# Patient Record
Sex: MALE | Race: WHITE
[De-identification: names, ages, dates, MRNs, and addresses within clinical notes are randomized per-mention and may not be internally consistent; named-entity substitution may affect disease eponyms.]

---

## 2022-01-31 ENCOUNTER — HOSPITAL ENCOUNTER (EMERGENCY)
Dept: HOSPITAL 49 - FER | Age: 72
Discharge: HOME | End: 2022-01-31
Payer: COMMERCIAL

## 2022-01-31 DIAGNOSIS — U07.1: Primary | ICD-10-CM

## 2022-01-31 DIAGNOSIS — I10: ICD-10-CM

## 2022-01-31 PROCEDURE — U0002 COVID-19 LAB TEST NON-CDC: HCPCS

## 2023-05-23 NOTE — PROGRESS NOTES
"Chief Complaint: Benign Prostatic Hypertrophy    Subjective         History of Present Illness  Kwaku Cloud is a 72 y.o. male presents to [facility] UROLOG[facility] to be seen for BPH.    Patient was seen by his PCP for weak urinary stream she ordered a PSA which was 9.2.  He was already on tamsulosin 0.8 mg daily, he was started on finasteride due to the weak urine stream.  He does report that his symptoms have started to improve already with the finasteride.  He also reports that he did have 2 episodes where he was traveling and had to stop hospital and have a catheter placed.  This was prior to starting on the finasteride.    Frequency- admits    Urgency- admits    Incontinence- admits    Nocturia- up to 10 times per night, but reports this was after drinking a soda with caffeine    Stream-weak    Perineal pain- denies    Dysuria- 2 nights ago, none now    GH- denies     surgeries- denies    Family history of  malignancy- denies    Hx of colon cancer- 2004    Cardiopulmonary- denies    Anticoagulants- denies    Smoker- denies    PSA  4/27/2023 9.20    Objective     Past Medical History:   Diagnosis Date   • Hypertension        History reviewed. No pertinent surgical history.      Current Outpatient Medications:   •  finasteride (PROSCAR) 5 MG tablet, Take 1 tablet by mouth Every Morning., Disp: , Rfl:   •  lisinopril (PRINIVIL,ZESTRIL) 20 MG tablet, Take 1 tablet by mouth Daily., Disp: , Rfl:   •  tamsulosin (FLOMAX) 0.4 MG capsule 24 hr capsule, TAKE TWO CAPSULES BY MOUTH AT BEDTIME FOR PROSTATE, Disp: , Rfl:     No Known Allergies     History reviewed. No pertinent family history.    Social History     Socioeconomic History   • Marital status:    Tobacco Use   • Smoking status: Former     Types: Cigarettes     Passive exposure: Past   • Smokeless tobacco: Never       Vital Signs:   Resp 16   Ht 182.9 cm (72\")   Wt 95.8 kg (211 lb 3.2 oz)   BMI 28.64 kg/m²      Physical Exam  Vitals " reviewed.   Constitutional:       Appearance: Normal appearance.   Neurological:      General: No focal deficit present.      Mental Status: He is alert and oriented to person, place, and time.   Psychiatric:         Mood and Affect: Mood normal.         Behavior: Behavior normal.          Result Review :   The following data was reviewed by: HOMERO Nichols on 05/30/2023:  Results for orders placed or performed in visit on 05/30/23   Bladder Scan   Result Value Ref Range    Urine Volume 22    POC Urinalysis Dipstick, Automated    Specimen: Urine   Result Value Ref Range    Color Yellow Yellow, Straw, Dark Yellow, Evon    Clarity, UA Clear Clear    Specific Gravity  1.030 1.005 - 1.030    pH, Urine 5.5 5.0 - 8.0    Leukocytes Trace (A) Negative    Nitrite, UA Negative Negative    Protein, POC Trace (A) Negative mg/dL    Glucose, UA Negative Negative mg/dL    Ketones, UA Negative Negative    Urobilinogen, UA 0.2 E.U./dL Normal, 0.2 E.U./dL    Bilirubin Negative Negative    Blood, UA Negative Negative    Lot Number [id number]     Expiration Date 7/2,024           Bladder Scan interpretation 05/30/2023    Estimation of residual urine via [email or web address] 3000 Verathon Bladder Scan  MA/nurse performing: Belia COLBERT MA  Residual Urine: 22 ml  Indication: Benign prostatic hyperplasia with lower urinary tract symptoms, symptom details unspecified    Elevated prostate specific antigen (PSA)   Position: Supine  Examination: Incremental scanning of the suprapubic area using 2.0 MHz transducer using copious amounts of acoustic gel.   Findings: An anechoic area was demonstrated which represented the bladder, with measurement of residual urine as noted. I inspected this myself. In that the residual urine was  insignificant, refer to plan for treatment and plan necessary at this time.         Procedures        Assessment and Plan    Diagnoses and all orders for this visit:    1. Benign prostatic hyperplasia with lower urinary tract  symptoms, symptom details unspecified (Primary)  -     Bladder Scan  -     POC Urinalysis Dipstick, Automated    2. Elevated prostate specific antigen (PSA)  -     MRI Prostate With & Without Contrast; Future    Given his elevated PSA, we will go ahead and schedule him for an MRI of the prostate.  If his MRI is negative, we will discuss options for procedures for opening of his prostate.  If his MRI is positive we will schedule him for biopsy.    He will follow-up with me 1 week after MRI to go over results and come up with a plan.  He cannot do the MRI until at least July due to travel during June.  This will also give more time for the finasteride to work to see if he is at the point where he wants to consider a procedure.        Follow Up   Return for 1 week after MRI.  Patient was given instructions and counseling regarding his condition or for health maintenance advice. Please see specific information pulled into the AVS if appropriate.         This document has been electronically signed by HOMERO Nichols  May 30, 2023 10:52 EDT

## 2023-05-30 ENCOUNTER — OFFICE VISIT (OUTPATIENT)
Dept: UROLOGY | Facility: CLINIC | Age: 73
End: 2023-05-30

## 2023-05-30 VITALS — WEIGHT: 211.2 LBS | HEIGHT: 72 IN | BODY MASS INDEX: 28.61 KG/M2 | RESPIRATION RATE: 16 BRPM

## 2023-05-30 DIAGNOSIS — N40.1 BENIGN PROSTATIC HYPERPLASIA WITH LOWER URINARY TRACT SYMPTOMS, SYMPTOM DETAILS UNSPECIFIED: Primary | ICD-10-CM

## 2023-05-30 DIAGNOSIS — R97.20 ELEVATED PROSTATE SPECIFIC ANTIGEN (PSA): ICD-10-CM

## 2023-05-30 LAB
BILIRUB BLD-MCNC: NEGATIVE MG/DL
CLARITY, POC: CLEAR
COLOR UR: YELLOW
EXPIRATION DATE: ABNORMAL
GLUCOSE UR STRIP-MCNC: NEGATIVE MG/DL
KETONES UR QL: NEGATIVE
LEUKOCYTE EST, POC: ABNORMAL
Lab: ABNORMAL
NITRITE UR-MCNC: NEGATIVE MG/ML
PH UR: 5.5 [PH] (ref 5–8)
PROT UR STRIP-MCNC: ABNORMAL MG/DL
RBC # UR STRIP: NEGATIVE /UL
SP GR UR: 1.03 (ref 1–1.03)
URINE VOLUME: 22
UROBILINOGEN UR QL: ABNORMAL

## 2023-05-30 RX ORDER — FINASTERIDE 5 MG/1
5 TABLET, FILM COATED ORAL EVERY MORNING
COMMUNITY
Start: 2023-04-28

## 2023-05-30 RX ORDER — LISINOPRIL 20 MG/1
20 TABLET ORAL DAILY
COMMUNITY
Start: 2023-05-08

## 2023-05-30 RX ORDER — TAMSULOSIN HYDROCHLORIDE 0.4 MG/1
CAPSULE ORAL
COMMUNITY
Start: 2023-03-21

## 2023-06-02 ENCOUNTER — PATIENT ROUNDING (BHMG ONLY) (OUTPATIENT)
Dept: SURGERY | Facility: CLINIC | Age: 73
End: 2023-06-02

## 2023-06-02 NOTE — PROGRESS NOTES
June 2, 2023    Hello, may I speak with Kwaku Cloud?    My name is CHAPARRITA CHUA      I am  with [facility]  [facility]  [address]  [place] [address] [phone].    Before we get started may I verify your date of birth? 1950    I am calling to officially welcome you to our practice and ask about your recent visit. Is this a good time to talk? yes    Tell me about your visit with us. What things went well?  PATIENT STATED EVERYONE WAS GREAT AT THE APPOINTMENT. HE REITERATED THAT HE APPRECIATED THE WAY OUR OFFICE AND STAFF WERE FRIENDLY AND KNOWLEDGEABLE        We're always looking for ways to make our patients' experiences even better. Do you have recommendations on ways we may improve?  no    Overall were you satisfied with your first visit to our practice? yes       I appreciate you taking the time to speak with me today. Is there anything else I can do for you? no      Thank you, and have a great day.

## 2023-07-25 ENCOUNTER — HOSPITAL ENCOUNTER (OUTPATIENT)
Dept: CT IMAGING | Facility: HOSPITAL | Age: 73
Discharge: HOME | End: 2023-07-25
Admitting: NURSE PRACTITIONER
Payer: COMMERCIAL

## 2023-07-25 DIAGNOSIS — N21.0 BLADDER CALCULI: ICD-10-CM

## 2023-07-25 PROCEDURE — 74176 CT ABD & PELVIS W/O CONTRAST: CPT

## 2023-07-28 PROBLEM — N21.0 BLADDER STONE: Status: ACTIVE | Noted: 2023-07-28

## 2023-07-28 PROBLEM — N40.1 BENIGN PROSTATIC HYPERPLASIA WITH LOWER URINARY TRACT SYMPTOMS: Status: ACTIVE | Noted: 2023-07-28

## 2023-07-28 PROBLEM — R97.20 ELEVATED PSA: Status: ACTIVE | Noted: 2023-07-28

## 2023-08-01 ENCOUNTER — OFFICE VISIT (OUTPATIENT)
Dept: UROLOGY | Facility: CLINIC | Age: 73
End: 2023-08-01
Payer: COMMERCIAL

## 2023-08-01 VITALS — RESPIRATION RATE: 16 BRPM | WEIGHT: 212.8 LBS | HEIGHT: 72 IN | BODY MASS INDEX: 28.82 KG/M2

## 2023-08-01 DIAGNOSIS — N40.1 BENIGN PROSTATIC HYPERPLASIA WITH LOWER URINARY TRACT SYMPTOMS, SYMPTOM DETAILS UNSPECIFIED: Primary | ICD-10-CM

## 2023-08-01 DIAGNOSIS — R97.20 ELEVATED PSA: ICD-10-CM

## 2023-08-01 DIAGNOSIS — N21.0 BLADDER STONE: ICD-10-CM

## 2023-08-01 DIAGNOSIS — R06.02 SHORTNESS OF BREATH: ICD-10-CM

## 2023-08-01 RX ORDER — OMEPRAZOLE 20 MG/1
1 CAPSULE, DELAYED RELEASE ORAL DAILY
COMMUNITY
Start: 2023-06-01

## 2023-08-15 ENCOUNTER — TELEPHONE (OUTPATIENT)
Dept: UROLOGY | Facility: CLINIC | Age: 73
End: 2023-08-15
Payer: COMMERCIAL

## 2023-08-15 NOTE — TELEPHONE ENCOUNTER
PT CALLED TO R/S HIS REE W/ SANDEEP     STATES DO NOT DOUBLE BOOK, PLEASE ADVISE ON SCHEDULING...    ALSO, PATIENT WOULD LIKE A CALL FROM MEDICAL STAFF EXPLAINING PROCEDURE.

## 2023-08-16 NOTE — TELEPHONE ENCOUNTER
Called and spoke with patient, he asked me to go over the cystoscopy procedure with him. I made patient aware  will use lidocaine jelly to numb the urethra, insert the scope with flowing water, look in the bladder for any abnormalities, tumors, stones, etc. Patient asked if it was painful, I explained the whole procedure might last about 3 minutes, most of our patients tolerate procedure very well and we do several of these in office daily. Patient agreed and would like to reschedule appt.

## 2023-08-16 NOTE — TELEPHONE ENCOUNTER
CALLED PT BACK AND R/S APPT    Future Appointments         Provider Department Center    8/29/2023 3:30 PM Jaylen Hines MD; PROC RM 1 UROLOGY [place] [facility] UROLOG[facility]    9/28/2023 3:00 PM Yosvany Greene MD [facility] PULMONARY & CRITICAL CARE MEDICINE [facility]

## 2023-08-28 ENCOUNTER — TELEPHONE (OUTPATIENT)
Dept: UROLOGY | Facility: CLINIC | Age: 73
End: 2023-08-28

## 2023-08-28 NOTE — TELEPHONE ENCOUNTER
Pt is scheduled for a cysto in the office tomorrow. He does not think he can tolerate it. He had one before at another office and states that he could not do it again. Can you please call and discuss?

## 2023-08-29 ENCOUNTER — PROCEDURE VISIT (OUTPATIENT)
Dept: UROLOGY | Facility: CLINIC | Age: 73
End: 2023-08-29
Payer: COMMERCIAL

## 2023-08-29 DIAGNOSIS — N40.1 BENIGN PROSTATIC HYPERPLASIA WITH LOWER URINARY TRACT SYMPTOMS, SYMPTOM DETAILS UNSPECIFIED: Primary | ICD-10-CM

## 2023-08-29 NOTE — TELEPHONE ENCOUNTER
Called and spoke with patient. Made him aware I received results of the cystoscopy that was done in [place]. He will still come in today for TRUS and discussion of aquablation.

## 2023-08-31 ENCOUNTER — TELEPHONE (OUTPATIENT)
Dept: UROLOGY | Facility: CLINIC | Age: 73
End: 2023-08-31
Payer: COMMERCIAL

## 2023-10-30 ENCOUNTER — TELEPHONE (OUTPATIENT)
Dept: UROLOGY | Facility: CLINIC | Age: 73
End: 2023-10-30
Payer: COMMERCIAL

## 2023-10-30 DIAGNOSIS — N40.1 BENIGN PROSTATIC HYPERPLASIA WITH LOWER URINARY TRACT SYMPTOMS, SYMPTOM DETAILS UNSPECIFIED: Primary | ICD-10-CM

## 2023-10-30 RX ORDER — TAMSULOSIN HYDROCHLORIDE 0.4 MG/1
1 CAPSULE ORAL 2 TIMES DAILY
Qty: 180 CAPSULE | Refills: 4 | Status: SHIPPED | OUTPATIENT
Start: 2023-10-30

## 2023-10-30 NOTE — TELEPHONE ENCOUNTER
PATIENT CALLED AND ASKED FOR A PRESCRIPTION FOR FLOMAX 0.4MG TAKE 2 BEFORE BEDTIME TO GO TO [facility] IN [place].    HE SAID HIS PREVIOUS UROLOGIST PRESCRIBED THIS FOR HIM BEFORE HE CAME HERE, SO NO ONE HERE HAS WRITTEN THE PRESCRIPTION FOR HIM YET.

## 2023-10-30 NOTE — TELEPHONE ENCOUNTER
Notified pt that per Shannan NP, he can take 2 Flomax at bedtime. He verified pharmacy and verbalized understanding

## 2023-12-12 NOTE — PROGRESS NOTES
Chief Complaint: Urologic complaint    Subjective         History of Present Illness  Kwaku Cloud is a 72 y.o. male         BPH with bladder stones  Elevated PSA      Patient currently on finasteride for the last 6 months.  Also on Flomax 0.8 mg daily    Not bothered voiding okay currently      No CAD, no anticoagulation, non-smoker      ED -still worried about this.  Patient is having trouble with erections for greater than 1 year cannot have intercourse currently.            Previous      6/22 cystoscopy-Dr. Jamison - prostate lateral lobes with moderate obstruction median lobe with severe obstruction.  Small bladder stone.  Bladder mucosa moderately trabeculated.    6/22  Urologist records reviewed - started finasteride, discussed TURP      7/25/2023 CT abdomen/pelvis without - noncalcified lung nodules.  Consider low-dose CT chest.  Stones in the urinary bladder, 10 stones largest being 1 cm.  99g prostate .  Images reviewed      Has had retention x2 with catheter placement prior to starting finasteride    Frequency, urgency, incontinence nocturia x10 if he drinks caffeine in the evening, weak stream    No  surgeries, no  family history    Colon cancer 2004    He does have SOA at times      PVR    12/23     014    Bladder Scan interpretation 12/15/2023    Estimation of residual urine via BVI 3000 Verathon Bladder Scan  MA/nurse performing: sl  Residual Urine: 14 ml  Indication: Benign prostatic hyperplasia with lower urinary tract symptoms, symptom details unspecified   Position: Supine  Examination: Incremental scanning of the suprapubic area using 2.0 MHz transducer using copious amounts of acoustic gel.   Findings: An anechoic area was demonstrated which represented the bladder, with measurement of residual urine as noted. I inspected this myself. In that the residual urine was stable or insignificant, refer to plan for treatment and plan necessary at this time.        6/23   022, UA-negative    4/23    1.1, GFR  > 60      PSA    7/23   MRI prostate - 106 g , thickening of the bladder wall, no signs of localizing lesion.  Bladder stone  PSAd  0.08  4/23    9.2      IPSS - 28  Quality of Life - 4      Objective     Past Medical History:   Diagnosis Date    Hypertension        No past surgical history on file.      Current Outpatient Medications:     finasteride (PROSCAR) 5 MG tablet, Take 1 tablet by mouth Every Morning., Disp: , Rfl:     lisinopril (PRINIVIL,ZESTRIL) 20 MG tablet, Take 1 tablet by mouth Daily., Disp: , Rfl:     omeprazole (priLOSEC) 20 MG capsule, Take 1 capsule by mouth Daily., Disp: , Rfl:     tamsulosin (FLOMAX) 0.4 MG capsule 24 hr capsule, Take 1 capsule by mouth 2 (Two) Times a Day., Disp: 180 capsule, Rfl: 4    No Known Allergies     No family history on file.    Social History     Socioeconomic History    Marital status:    Tobacco Use    Smoking status: Former     Types: Cigarettes     Passive exposure: Past    Smokeless tobacco: Never   Vaping Use    Vaping Use: Never used       Vital Signs:   There were no vitals taken for this visit.                 Assessment and Plan    Diagnoses and all orders for this visit:    1. Benign prostatic hyperplasia with lower urinary tract symptoms, symptom details unspecified (Primary)        Bladder stones/BPH history of retention      Patient's been in retention a few times and is forming bladder stones which we discussed is both indications for prostate surgery.  I did recommend cystolitholapaxy with aquablation.  I did go over them with him again today.    Risks and benefits were discussed including bleeding, infection and damage to the urinary system.  We also discussed the risk of anesthesia up to and including death.  Patient voiced understanding     Patient has had a hard time with office cystoscopy in the past, is not want to do this anymore.  Because of his bladder stones and has been several months ago and have a follow-up in 6 weeks  with CT abdomen/pelvis without to see what his stones look like.  At that time we can make a decision on whether or not we can do cystoscopy with active Paxene Aquablation at the same time.  Patient voiced understanding    Will try to get him scheduled at his next visit      PVR at f/u         ED    Patient is wanting treatment.   After discussion we will start him on tadalafil 20 mg tablets as needed sexual intercourse.  Risk and benefits discussed.  Patient understands he has erection for greater than 4 hours go to emergency room or risk no erections in the future.

## 2023-12-15 ENCOUNTER — OFFICE VISIT (OUTPATIENT)
Dept: UROLOGY | Facility: CLINIC | Age: 73
End: 2023-12-15
Payer: COMMERCIAL

## 2023-12-15 VITALS — HEIGHT: 72 IN | BODY MASS INDEX: 29.66 KG/M2 | RESPIRATION RATE: 16 BRPM | WEIGHT: 219 LBS

## 2023-12-15 DIAGNOSIS — N40.1 BENIGN PROSTATIC HYPERPLASIA WITH LOWER URINARY TRACT SYMPTOMS, SYMPTOM DETAILS UNSPECIFIED: Primary | ICD-10-CM

## 2023-12-15 DIAGNOSIS — N52.01 ERECTILE DYSFUNCTION DUE TO ARTERIAL INSUFFICIENCY: ICD-10-CM

## 2023-12-15 LAB — SPECIMEN VOL 24H UR: 14 L

## 2023-12-15 RX ORDER — TADALAFIL 20 MG/1
20 TABLET ORAL DAILY PRN
Qty: 5 TABLET | Refills: 5 | Status: SHIPPED | OUTPATIENT
Start: 2023-12-15

## 2024-01-02 ENCOUNTER — HOSPITAL ENCOUNTER (OUTPATIENT)
Dept: CT IMAGING | Facility: HOSPITAL | Age: 74
Discharge: HOME | End: 2024-01-02
Admitting: UROLOGY
Payer: COMMERCIAL

## 2024-01-02 DIAGNOSIS — N40.1 BENIGN PROSTATIC HYPERPLASIA WITH LOWER URINARY TRACT SYMPTOMS, SYMPTOM DETAILS UNSPECIFIED: ICD-10-CM

## 2024-01-02 PROCEDURE — 74176 CT ABD & PELVIS W/O CONTRAST: CPT

## 2024-01-23 NOTE — PROGRESS NOTES
Chief Complaint: Urologic complaint    Subjective         History of Present Illness  Kwaku Cloud is a 73 y.o. male         BPH with bladder stones  Elevated PSA        Cannot do office cystoscopy        1/2/2024 CT abdomen/pelvis without - fatty liver, 2 cm cluster of stones posterior urinary bladder on the left.  Enlarged prostate gland.      on finasteride for the last 6 months.   on Flomax 0.8 mg daily.  No side effects.    voiding okay currently      No CAD, no anticoagulation, non-smoker      ED -still worried about this.  Patient is having trouble with erections for greater than 1 year cannot have intercourse currently.      tadalafil 20 mg -did not help    Not interested in Trimix.      Previous      6/22 cystoscopy-Dr. Jamison - prostate lateral lobes with moderate obstruction median lobe with severe obstruction.  Small bladder stone.  Bladder mucosa moderately trabeculated.    6/22  Urologist records reviewed - started finasteride, discussed TURP      7/25/2023 CT abdomen/pelvis without - noncalcified lung nodules.  Consider low-dose CT chest.  Stones in the urinary bladder, 10 stones largest being 1 cm.  99g prostate .  Images reviewed      Has had retention x2 with catheter placement prior to starting finasteride    Frequency, urgency, incontinence nocturia x10 if he drinks caffeine in the evening, weak stream    No  surgeries, no  family history    Colon cancer 2004    He does have SOA at times      PVR    12/23     014         6/23   022, UA-negative    4/23   1.1, GFR  > 60      PSA    7/23   MRI prostate - 106 g , thickening of the bladder wall, no signs of localizing lesion.  Bladder stone  PSAd  0.08  4/23    9.2      IPSS - 28  Quality of Life - 4      Bladder Scan interpretation 01/26/2024    Estimation of residual urine via BVI 3000 Verathon Bladder Scan  MA/nurse performing: SHIELA Bilyl  Residual Urine: 18 ml  Indication: Benign prostatic hyperplasia with lower urinary tract symptoms,  symptom details unspecified    Erectile dysfunction due to arterial insufficiency   Position: Supine  Examination: Incremental scanning of the suprapubic area using 2.0 MHz transducer using copious amounts of acoustic gel.   Findings: An anechoic area was demonstrated which represented the bladder, with measurement of residual urine as noted. I inspected this myself. In that the residual urine was stable or insignificant, refer to plan for treatment and plan necessary at this time.          Objective     Past Medical History:   Diagnosis Date    Hypertension        No past surgical history on file.      Current Outpatient Medications:     finasteride (PROSCAR) 5 MG tablet, Take 1 tablet by mouth Every Morning., Disp: , Rfl:     lisinopril (PRINIVIL,ZESTRIL) 20 MG tablet, Take 1 tablet by mouth Daily., Disp: , Rfl:     omeprazole (priLOSEC) 20 MG capsule, Take 1 capsule by mouth Daily., Disp: , Rfl:     tadalafil (CIALIS) 20 MG tablet, Take 1 tablet by mouth Daily As Needed for Erectile Dysfunction., Disp: 5 tablet, Rfl: 5    tamsulosin (FLOMAX) 0.4 MG capsule 24 hr capsule, Take 1 capsule by mouth 2 (Two) Times a Day., Disp: 180 capsule, Rfl: 4    No Known Allergies     No family history on file.    Social History     Socioeconomic History    Marital status:    Tobacco Use    Smoking status: Former     Types: Cigarettes     Passive exposure: Past    Smokeless tobacco: Never   Vaping Use    Vaping Use: Never used       Vital Signs:   There were no vitals taken for this visit.                 Assessment and Plan    Diagnoses and all orders for this visit:    1. Benign prostatic hyperplasia with lower urinary tract symptoms, symptom details unspecified (Primary)    2. Erectile dysfunction due to arterial insufficiency        Bladder stones/BPH history of retention     I did recommend cystolitholapaxy with aquablation.   Risks and benefits were discussed including bleeding, infection and damage to the urinary  system.  We also discussed the risk of anesthesia up to and including death.  Patient voiced understanding     Patient has had a hard time with office cystoscopy in the past, is not want to do this anymore.  Because of his bladder stones and has been several months ago and have a follow-up in 6 weeks with CT abdomen/pelvis without to see what his stones look like.  At that time we can make a decision on whether or not we can do cystoscopy with active Paxene Aquablation at the same time.  Patient voiced understanding      Urine culture 2 weeks before       ED      Oral medications not working, not currently interested in Trimix

## 2024-01-26 ENCOUNTER — PREP FOR SURGERY (OUTPATIENT)
Dept: OTHER | Facility: HOSPITAL | Age: 74
End: 2024-01-26
Payer: COMMERCIAL

## 2024-01-26 ENCOUNTER — OFFICE VISIT (OUTPATIENT)
Dept: UROLOGY | Facility: CLINIC | Age: 74
End: 2024-01-26
Payer: COMMERCIAL

## 2024-01-26 VITALS — RESPIRATION RATE: 16 BRPM | HEIGHT: 72 IN | BODY MASS INDEX: 29.8 KG/M2 | WEIGHT: 220 LBS

## 2024-01-26 DIAGNOSIS — N40.1 BENIGN PROSTATIC HYPERPLASIA WITH LOWER URINARY TRACT SYMPTOMS, SYMPTOM DETAILS UNSPECIFIED: Primary | ICD-10-CM

## 2024-01-26 DIAGNOSIS — N40.0 BENIGN PROSTATIC HYPERPLASIA, UNSPECIFIED WHETHER LOWER URINARY TRACT SYMPTOMS PRESENT: Primary | ICD-10-CM

## 2024-01-26 DIAGNOSIS — N52.01 ERECTILE DYSFUNCTION DUE TO ARTERIAL INSUFFICIENCY: ICD-10-CM

## 2024-01-26 PROBLEM — R39.15 BENIGN PROSTATIC HYPERPLASIA (BPH) WITH URINARY URGENCY: Status: ACTIVE | Noted: 2024-01-26

## 2024-01-26 LAB — SPECIMEN VOL 24H UR: 0 L

## 2024-01-26 RX ORDER — SILDENAFIL CITRATE 20 MG/1
TABLET ORAL
COMMUNITY
Start: 2023-12-21

## 2024-01-26 RX ORDER — SODIUM CHLORIDE 9 MG/ML
40 INJECTION, SOLUTION INTRAVENOUS AS NEEDED
OUTPATIENT
Start: 2024-01-26

## 2024-01-26 RX ORDER — SODIUM CHLORIDE 9 MG/ML
100 INJECTION, SOLUTION INTRAVENOUS CONTINUOUS
OUTPATIENT
Start: 2024-01-26

## 2024-01-26 RX ORDER — SODIUM CHLORIDE 0.9 % (FLUSH) 0.9 %
10 SYRINGE (ML) INJECTION AS NEEDED
OUTPATIENT
Start: 2024-01-26

## 2024-01-26 RX ORDER — LEVOFLOXACIN 5 MG/ML
500 INJECTION, SOLUTION INTRAVENOUS ONCE
OUTPATIENT
Start: 2024-01-26 | End: 2024-01-26

## 2024-01-26 RX ORDER — SODIUM CHLORIDE 0.9 % (FLUSH) 0.9 %
3 SYRINGE (ML) INJECTION EVERY 12 HOURS SCHEDULED
OUTPATIENT
Start: 2024-01-26

## 2024-03-28 ENCOUNTER — TELEPHONE (OUTPATIENT)
Dept: UROLOGY | Facility: CLINIC | Age: 74
End: 2024-03-28
Payer: COMMERCIAL

## 2024-03-28 NOTE — TELEPHONE ENCOUNTER
SEBASTIÁN CALLED FROM  SURGERY REVIEW.  SHE SAID THE PATIENT IS SCHEDULED FOR THE AQUABLATION ON 04/11/24.      SHE WANTS TO KNOW IF HE IS HAVING OR HAS HAD A UROFLOW, AND IF HE IS USING A CATHETER.    SHE SAID SHE NEEDS TO KNOW FOR COMPLIANCE REVIEW.

## 2024-03-29 NOTE — TELEPHONE ENCOUNTER
LVM letting Lian know that we are getting pt set up for a uroflow appt next week and that he does not use catheters.

## 2024-03-29 NOTE — TELEPHONE ENCOUNTER
CALLED  OFFICE AND SPOKE W/SINDY AND HE SCHEDULED UROFLOW TEST ON 4/3/24/CALLED PT AND INFORMED PT OF APPT/PT SAID HE IS GOING TO CALL  OFFICE AND RS/UROFLOW

## 2024-04-01 ENCOUNTER — TELEPHONE (OUTPATIENT)
Dept: UROLOGY | Facility: CLINIC | Age: 74
End: 2024-04-01
Payer: COMMERCIAL

## 2024-04-02 ENCOUNTER — PROCEDURE VISIT (OUTPATIENT)
Dept: UROLOGY | Facility: CLINIC | Age: 74
End: 2024-04-02
Payer: COMMERCIAL

## 2024-04-02 DIAGNOSIS — N40.1 BENIGN PROSTATIC HYPERPLASIA WITH LOWER URINARY TRACT SYMPTOMS, SYMPTOM DETAILS UNSPECIFIED: Primary | ICD-10-CM

## 2024-04-02 NOTE — PROGRESS NOTES
Procedure   UROFLOWMETRY    Dx for indication BPH        Voiding time :                 23.1  S    Timed peak flow:             6.7  S    Q-[place]                          11.0 mL/S    Average flow rate            5.5 mL/S    Interval     2    Voided volume               123 mL    Post void bladder scan    34 cc.      Low urine volume may contribute to lower average flow rate.  Patient reports voids approximately 3-4 times per day and estimates amount per void about the same.  Also small amount of fluid intake on average a couple of soft drinks and a glass of water on average.  Reports his primary symptom is feeling not able to urinate unless he takes Flomax.     Patient to follow up with ordering provider Dr. Jaylen Hines for further treatment and evaluation as planned.         printout image to be scanned into media

## 2024-04-08 DIAGNOSIS — N52.01 ERECTILE DYSFUNCTION DUE TO ARTERIAL INSUFFICIENCY: ICD-10-CM

## 2024-04-08 DIAGNOSIS — N40.1 BENIGN PROSTATIC HYPERPLASIA WITH LOWER URINARY TRACT SYMPTOMS, SYMPTOM DETAILS UNSPECIFIED: ICD-10-CM

## 2024-04-08 NOTE — TELEPHONE ENCOUNTER
Spoke to pt who said he did do his urine culture at a [facility]. I did speak to the clinic who did say he did a urine culture. It was positive for ecoli. They are faxing the report to me.

## 2024-04-08 NOTE — TELEPHONE ENCOUNTER
Notified pt that I did speak to the [facility] and Dr Hines regarding a supposed positive ucx, growing out Ecoli. I did advise pt that Dr Hines will not be able to do surgery this week if we get the report and is in in fact, positive. Pt understands and states he is having some burning. He has not been on any abx. I advised him I will be in touch once we hear something.

## 2024-05-28 ENCOUNTER — TELEPHONE (OUTPATIENT)
Dept: UROLOGY | Facility: CLINIC | Age: 74
End: 2024-05-28
Payer: COMMERCIAL

## 2024-05-28 DIAGNOSIS — N30.00 ACUTE CYSTITIS WITHOUT HEMATURIA: Primary | ICD-10-CM

## 2024-05-28 NOTE — TELEPHONE ENCOUNTER
Called pt. He would like to reschedule his surgery to 11/07/24. He is aware that he will receive a call on the day prior to the procedure with arrival time and a separate call from PAT to go over all preop instructions. He will do a ua/ucx 2 weeks prior to procedure as well. Pt verbalized understanding of all instruction via teach back

## 2024-05-28 NOTE — TELEPHONE ENCOUNTER
Patient called and he states he will need to reschedule states his son is at [facility] with congestive heart failure and needs to reschedule his surgery until November. Patient would like a call back.

## 2024-10-23 ENCOUNTER — LAB (OUTPATIENT)
Dept: LAB | Facility: HOSPITAL | Age: 74
End: 2024-10-23
Payer: COMMERCIAL

## 2024-10-23 DIAGNOSIS — N30.00 ACUTE CYSTITIS WITHOUT HEMATURIA: ICD-10-CM

## 2024-10-23 LAB
BACTERIA UR QL AUTO: ABNORMAL /HPF
BILIRUB UR QL STRIP: NEGATIVE
CLARITY UR: ABNORMAL
COD CRY URNS QL: PRESENT /HPF
COLOR UR: ABNORMAL
GLUCOSE UR STRIP-MCNC: ABNORMAL MG/DL
HGB UR QL STRIP.AUTO: NEGATIVE
HYALINE CASTS UR QL AUTO: ABNORMAL /LPF
KETONES UR QL STRIP: ABNORMAL
LEUKOCYTE ESTERASE UR QL STRIP.AUTO: ABNORMAL
NITRITE UR QL STRIP: NEGATIVE
PH UR STRIP.AUTO: 5.5 [PH] (ref 5–8)
PROT UR QL STRIP: ABNORMAL
RBC # UR STRIP: ABNORMAL /HPF
REF LAB TEST METHOD: ABNORMAL
SP GR UR STRIP: 1.02 (ref 1–1.03)
SQUAMOUS #/AREA URNS HPF: ABNORMAL /HPF
UROBILINOGEN UR QL STRIP: ABNORMAL
WBC # UR STRIP: ABNORMAL /HPF

## 2024-10-23 PROCEDURE — 81001 URINALYSIS AUTO W/SCOPE: CPT

## 2024-10-23 PROCEDURE — 87086 URINE CULTURE/COLONY COUNT: CPT

## 2024-10-24 ENCOUNTER — TELEPHONE (OUTPATIENT)
Dept: UROLOGY | Age: 74
End: 2024-10-24

## 2024-10-24 NOTE — TELEPHONE ENCOUNTER
Caller: Kwaku Cloud     Relationship: SELF    Best call back number:  [phone]    What is the best time to reach you: ANYTIME    Who are you requesting to speak with (clinical staff, provider,  specific staff member): CLINICAL    Do you know the name of the person who called: INCOMING CALL    What was the call regarding: PT HAD URINE LABS DONE AND IS WANTING TO MAKE SURE THAT EVERYTHING IS GOOD TO GO FOR HIS PROCEDURE.     PLEASE REVIEW AND GIVE PT A CALL BACK.    Is it okay if the provider responds through [employer]: NO, PHONE CALL PREFERRED

## 2024-10-25 ENCOUNTER — TELEPHONE (OUTPATIENT)
Dept: UROLOGY | Facility: CLINIC | Age: 74
End: 2024-10-25
Payer: COMMERCIAL

## 2024-10-25 LAB — BACTERIA SPEC AEROBE CULT: NO GROWTH

## 2024-10-25 NOTE — TELEPHONE ENCOUNTER
Spoke with niece.  Need patient to call back.  We need to cancel his surgery for Nov 7 due to fluid shortage.  We will call back when we will be able to get him back on the schedule.  OK for HUB to relay message.

## 2024-10-25 NOTE — TELEPHONE ENCOUNTER
PATIENT RETURNED CALL.  I READ THE MESSAGE TO HIM, PER ARIEL:    Spoke with niece.  Need patient to call back.  We need to cancel his surgery for Nov 7 due to fluid shortage.  We will call back when we will be able to get him back on the schedule.       PATIENT VOICED UNDERSTANDING.

## 2024-10-29 NOTE — TELEPHONE ENCOUNTER
PT CALLED TO CHECK ON STATUS OF OF TEST RESULTS-URINE CULTURE FROM 10/23/24 - PLEASE CALL TO DISCUSS RESULTS.    PER PREVIOUS MESSAGE WILL ALSO NEED TO RESCHEDULE PROCEDURE THAT IS SCHEDULED ON 11/7/24

## 2025-01-08 ENCOUNTER — TELEPHONE (OUTPATIENT)
Dept: UROLOGY | Age: 75
End: 2025-01-08
Payer: COMMERCIAL

## 2025-01-08 NOTE — TELEPHONE ENCOUNTER
BRENDAN CALLED FROM [facility].  SHE ASKED FOR REFILLS ON FLOMAX.  SHE SAID THE PATIENT WAS THERE, AND ASKED IF THEY WOULD GET AN ANSWER TODAY.  I TOLD HER DR. HOPKINS IS NOT HERE.   I WOULD SEND A MESSAGE TO THE NURSE.  HE MAY HAVE TO HAVE AN APPOINTMENT.    CALL AND ASK FOR BRENDAN OR SERGEI.    #[phone]

## 2025-01-09 DIAGNOSIS — N40.1 BENIGN PROSTATIC HYPERPLASIA WITH LOWER URINARY TRACT SYMPTOMS, SYMPTOM DETAILS UNSPECIFIED: ICD-10-CM

## 2025-01-09 RX ORDER — TAMSULOSIN HYDROCHLORIDE 0.4 MG/1
1 CAPSULE ORAL 2 TIMES DAILY
Qty: 180 CAPSULE | Refills: 4 | Status: SHIPPED | OUTPATIENT
Start: 2025-01-09

## 2025-01-15 ENCOUNTER — TELEPHONE (OUTPATIENT)
Dept: UROLOGY | Age: 75
End: 2025-01-15
Payer: COMMERCIAL

## 2025-01-15 NOTE — TELEPHONE ENCOUNTER
Spoke to patient. Patient is still interested in aquablation. I informed patient that we need a repeat uroflow, as well as a volume study and we will get him in for an appointment and get him scheduled. He verbalized understanding of information.

## 2025-02-09 NOTE — PROGRESS NOTES
Chief Complaint: Urologic complaint    Subjective         History of Present Illness  Kwaku Cloud is a 74 y.o. male             BPH with bladder stones  Elevated PSA        2/25 uroflow  -16.4,VV 68      currently on finasteride for the last 6 months.   on Flomax 0.8 mg daily.  Dependent on Flomax.    Tadalafil 20 - did not help.        No CAD, no anticoagulation, non-smoker        1/24 CT abdomen/pelvis without - 2 cm cluster of stones posterior urinary bladder on the left.    Previous      10/23/2024 UA-3-5 RBCs, no bacteria, nitrite negative, urine culture negative        4/1/24 urine culture - >  100,000 E. Coli -   canceled surgery, placed on Levaquin 500 mg x 10 days and Florajen.  Repeat culture, preferably one of our labs.  2 weeks before repeat surgery, place surgery on aThursday 4/24 Q-[place]leveland of 11, voided volume 123, PVR 34    1/24 CT abdomen/pelvis without - 2 cm cluster of stones posterior urinary bladder on the left.      6/22 cystoscopy-Dr. Jamison - prostate lateral lobes with moderate obstruction median lobe with severe obstruction.  Small bladder stone.  Bladder mucosa moderately trabeculated.    6/22  Urologist records reviewed - started finasteride, discussed TURP      7/25/2023 CT abdomen/pelvis without - noncalcified lung nodules.  Consider low-dose CT chest.  Stones in the urinary bladder, 10 stones largest being 1 cm.  99g prostate .  Images reviewed      Has had retention x2 with catheter placement prior to starting finasteride    Frequency, urgency, incontinence nocturia x10 if he drinks caffeine in the evening, weak stream    No  surgeries, no  family history    Colon cancer 2004    He does have SOA at times      PVR    12/23     014         6/23   022, UA-negative    4/23   1.1, GFR  > 60      PSA    7/23   MRI prostate - 106 g , thickening of the bladder wall, no signs of localizing lesion.  Bladder stone  PSAd  0.08  4/23    9.2      IPSS - 28  Quality of Life -  4      Objective           Vital Signs:   There were no vitals taken for this visit.                 Assessment and Plan    Diagnoses and all orders for this visit:    1. Bladder stone (Primary)    2. Benign prostatic hyperplasia with lower urinary tract symptoms, symptom details unspecified          Bladder stones/BPH history of retention      Proceed with cystolitholapaxy with TURP.    Risks and benefits were discussed including bleeding, infection and damage to the urinary system.  We also discussed the risk of anesthesia up to and including death.  Patient voiced understanding and would like to proceed.  Patient declined having a cystoscopy in the office.  I did discuss because been a while since he had a CT of his stones and I did not get a cystoscopy in the office there is a possibility that we would do the cystolitholapaxy and come back another day for the TURP.  Patient voiced understanding       ED      Tadalafil did not work, not interested in Trimix not going to worry about erections at this time

## 2025-02-10 ENCOUNTER — TELEPHONE (OUTPATIENT)
Dept: UROLOGY | Age: 75
End: 2025-02-10

## 2025-02-10 ENCOUNTER — LAB (OUTPATIENT)
Facility: HOSPITAL | Age: 75
End: 2025-02-10
Payer: COMMERCIAL

## 2025-02-10 ENCOUNTER — PROCEDURE VISIT (OUTPATIENT)
Dept: UROLOGY | Age: 75
End: 2025-02-10
Payer: COMMERCIAL

## 2025-02-10 ENCOUNTER — PREP FOR SURGERY (OUTPATIENT)
Dept: OTHER | Facility: HOSPITAL | Age: 75
End: 2025-02-10
Payer: COMMERCIAL

## 2025-02-10 DIAGNOSIS — N40.1 BENIGN PROSTATIC HYPERPLASIA WITH LOWER URINARY TRACT SYMPTOMS, SYMPTOM DETAILS UNSPECIFIED: Primary | ICD-10-CM

## 2025-02-10 DIAGNOSIS — N21.0 BLADDER STONE: Primary | ICD-10-CM

## 2025-02-10 DIAGNOSIS — R97.20 ELEVATED PSA: ICD-10-CM

## 2025-02-10 DIAGNOSIS — N40.1 BENIGN PROSTATIC HYPERPLASIA WITH LOWER URINARY TRACT SYMPTOMS, SYMPTOM DETAILS UNSPECIFIED: ICD-10-CM

## 2025-02-10 DIAGNOSIS — R97.20 ELEVATED PSA: Primary | ICD-10-CM

## 2025-02-10 LAB — PSA SERPL-MCNC: 2.18 NG/ML (ref 0–4)

## 2025-02-10 PROCEDURE — 84153 ASSAY OF PSA TOTAL: CPT

## 2025-02-10 PROCEDURE — 36415 COLL VENOUS BLD VENIPUNCTURE: CPT

## 2025-02-10 RX ORDER — SODIUM CHLORIDE 0.9 % (FLUSH) 0.9 %
3 SYRINGE (ML) INJECTION EVERY 12 HOURS SCHEDULED
OUTPATIENT
Start: 2025-02-10

## 2025-02-10 RX ORDER — SODIUM CHLORIDE 9 MG/ML
40 INJECTION, SOLUTION INTRAVENOUS AS NEEDED
OUTPATIENT
Start: 2025-02-10

## 2025-02-10 RX ORDER — SODIUM CHLORIDE 0.9 % (FLUSH) 0.9 %
10 SYRINGE (ML) INJECTION AS NEEDED
OUTPATIENT
Start: 2025-02-10

## 2025-03-03 DIAGNOSIS — N40.1 BENIGN PROSTATIC HYPERPLASIA WITH LOWER URINARY TRACT SYMPTOMS, SYMPTOM DETAILS UNSPECIFIED: Primary | ICD-10-CM

## 2025-03-06 ENCOUNTER — LAB (OUTPATIENT)
Facility: HOSPITAL | Age: 75
End: 2025-03-06
Payer: COMMERCIAL

## 2025-03-06 DIAGNOSIS — N40.1 BENIGN PROSTATIC HYPERPLASIA WITH LOWER URINARY TRACT SYMPTOMS, SYMPTOM DETAILS UNSPECIFIED: ICD-10-CM

## 2025-03-06 LAB
BACTERIA UR QL AUTO: NORMAL /HPF
BILIRUB UR QL STRIP: NEGATIVE
CLARITY UR: CLEAR
COLOR UR: YELLOW
GLUCOSE UR STRIP-MCNC: NEGATIVE MG/DL
HGB UR QL STRIP.AUTO: NEGATIVE
HYALINE CASTS UR QL AUTO: NORMAL /LPF
KETONES UR QL STRIP: NEGATIVE
LEUKOCYTE ESTERASE UR QL STRIP.AUTO: NEGATIVE
NITRITE UR QL STRIP: NEGATIVE
PH UR STRIP.AUTO: 6.5 [PH] (ref 5–8)
PROT UR QL STRIP: NEGATIVE
RBC # UR STRIP: NORMAL /HPF
REF LAB TEST METHOD: NORMAL
SP GR UR STRIP: 1.02 (ref 1–1.03)
SQUAMOUS #/AREA URNS HPF: NORMAL /HPF
UROBILINOGEN UR QL STRIP: NORMAL
WBC # UR STRIP: NORMAL /HPF

## 2025-03-06 PROCEDURE — 81001 URINALYSIS AUTO W/SCOPE: CPT

## 2025-03-06 PROCEDURE — 87086 URINE CULTURE/COLONY COUNT: CPT

## 2025-03-07 LAB — BACTERIA SPEC AEROBE CULT: NO GROWTH

## 2025-03-10 NOTE — PRE-PROCEDURE INSTRUCTIONS
PATIENT INSTRUCTED TO BE:    - NOTHING TO EAT AFTER MIDNIGHT OR CHEW, EXCEPT CAN HAVE SIPS OF WATER WITH MEDICATIONS     - TO HOLD ALL VITAMINS, SUPPLEMENTS, NSAIDS FOR ONE WEEK PRIOR TO THEIR SURGICAL PROCEDURE    - DO NOT TAKE ________N/A______________ 7 DAYS PRIOR TO PROCEDURE PER ANESTHESIA RECOMMENDATIONS/INSTRUCTIONS     - INSTRUCTED PT TO USE SURGICAL SOAP 1 TIME THE NIGHT PRIOR TO SURGERY _N/A__________ OR THE AM OF SURGERY ___N/A__________   USE THE SOAP FROM NECK TO TOES, AVOID THEIR FACE, HAIR, AND PRIVATE PARTS. IF USE THE SOAP THE NIGHT PRIOR TO SURGERY, CHANGE BED LINENS AND NO PETS IN THE BED.     INSTRUCTED NO LOTIONS, JEWELRY, PIERCINGS,  NAIL POLISH, OR DEODORANT DAY OF SURGERY    - IF DIABETIC, CHECK BLOOD GLUCOSE IF LESS THAN 70 OR HAVING SYMPTOMS CALL THE PREOP AREA FOR INSTRUCTIONS ON AM OF SURGERY ([phone] )    -INSTRUCTED TO TAKE THE FOLLOWING MEDICATIONS THE DAY OF SURGERY WITH SIPS OF WATER:                FLOMAX, PRILOSEC, PROSCAR         - DO NOT BRING ANY MEDICATIONS WITH YOU TO THE HOSPITAL THE DAY OF SURGERY, EXCEPT IF USE INHALERS. BRING INHALERS DAY OF SURGERY       - BRING CPAP OR BIPAP TO THE HOSPITAL ONLY IF YOU ARE SPENDING THE NIGHT    - DO NOT SMOKE OR VAPE 24 HOURS PRIOR TO PROCEDURE PER ANESTHESIA REQUEST     -MAKE SURE YOU HAVE A RIDE HOME OR SOMEONE TO STAY WITH YOU THE DAY OF THE PROCEDURE AFTER YOU GO HOME     - FOLLOW ANY OTHER INSTRUCTIONS GIVEN TO YOU BY YOUR SURGEON'S OFFICE.     - DAY OF SURGERY __7-56-07__________,[facility] ( [address]), YOU CAN  PARK OR SELF PARK. ENTER THE PAVILION THRU MAIN ENTRANCE, TAKE ELEVATORS TO THE FIRST FLOOR, CHECK IN AT THE DESK FOR REGISTRATION/ SURGERY.     - YOU WILL RECEIVE A PHONE CALL THE DAY PRIOR TO SURGERY BETWEEN 1PM AND 4 PM WITH ARRIVAL TIME, IF YOUR SURGERY IS ON A MONDAY YOU WILL RECEIVE A CALL THE FRIDAY PRIOR TO SURGERY DATE    - BRING CASH OR CREDIT CARD FOR COPAYMENT OF  MEDICATIONS AFTER SURGERY IF YOU USE THE HOSPITAL PHARMACY (MED[facility] TO BED)    - PREADMISSION TESTING NURSE YANELIS POWER -[phone] IF HAVE ANY QUESTIONS     -PATIENT PROVIDED THE NUMBER FOR PREOP SURGICAL DEPT IF HAD QUESTIONS AFTER HOURS PRIOR TO SURGERY ([phone] ).  INFORMED PT IF NO ANSWER, LEAVE A MESSAGE AND SOMEONE WILL RETURN THEIR CALL       PATIENT VERBALIZED UNDERSTANDING

## 2025-03-12 ENCOUNTER — ANESTHESIA (OUTPATIENT)
Dept: PERIOP | Facility: HOSPITAL | Age: 75
End: 2025-03-12
Payer: COMMERCIAL

## 2025-03-12 ENCOUNTER — ANESTHESIA EVENT (OUTPATIENT)
Dept: PERIOP | Facility: HOSPITAL | Age: 75
End: 2025-03-12
Payer: COMMERCIAL

## 2025-03-12 ENCOUNTER — HOSPITAL ENCOUNTER (OUTPATIENT)
Facility: HOSPITAL | Age: 75
Discharge: HOME | End: 2025-03-13
Attending: UROLOGY | Admitting: UROLOGY
Payer: COMMERCIAL

## 2025-03-12 DIAGNOSIS — N40.1 BENIGN PROSTATIC HYPERPLASIA WITH LOWER URINARY TRACT SYMPTOMS, SYMPTOM DETAILS UNSPECIFIED: ICD-10-CM

## 2025-03-12 DIAGNOSIS — N40.1 BPH WITH LOWER URINARY TRACT SYMPTOMS WITHOUT URINARY OBSTRUCTION: Primary | ICD-10-CM

## 2025-03-12 LAB
QT INTERVAL: 384 MS
QTC INTERVAL: 406 MS

## 2025-03-12 PROCEDURE — 25010000002 FENTANYL CITRATE (PF) 50 MCG/ML SOLUTION: Performed by: NURSE ANESTHETIST, CERTIFIED REGISTERED

## 2025-03-12 PROCEDURE — 25810000003 LACTATED RINGERS PER 1000 ML: Performed by: ANESTHESIOLOGY

## 2025-03-12 PROCEDURE — 25810000003 SODIUM CHLORIDE 0.9 % SOLUTION: Performed by: UROLOGY

## 2025-03-12 PROCEDURE — 25010000002 CEFOXITIN PER 1 G: Performed by: UROLOGY

## 2025-03-12 PROCEDURE — 25010000002 ONDANSETRON PER 1 MG: Performed by: NURSE ANESTHETIST, CERTIFIED REGISTERED

## 2025-03-12 PROCEDURE — 52318 REMOVE BLADDER STONE: CPT | Performed by: UROLOGY

## 2025-03-12 PROCEDURE — 25010000002 LIDOCAINE PF 2% 2 % SOLUTION: Performed by: NURSE ANESTHETIST, CERTIFIED REGISTERED

## 2025-03-12 PROCEDURE — 25010000002 SUGAMMADEX 200 MG/2ML SOLUTION: Performed by: NURSE ANESTHETIST, CERTIFIED REGISTERED

## 2025-03-12 PROCEDURE — 25010000002 DEXAMETHASONE PER 1 MG: Performed by: NURSE ANESTHETIST, CERTIFIED REGISTERED

## 2025-03-12 PROCEDURE — 25010000002 HYDROMORPHONE 1 MG/ML SOLUTION: Performed by: NURSE ANESTHETIST, CERTIFIED REGISTERED

## 2025-03-12 PROCEDURE — 93005 ELECTROCARDIOGRAM TRACING: CPT | Performed by: ANESTHESIOLOGY

## 2025-03-12 PROCEDURE — 25010000002 MIDAZOLAM PER 1MG: Performed by: ANESTHESIOLOGY

## 2025-03-12 PROCEDURE — 52601 PROSTATECTOMY (TURP): CPT | Performed by: UROLOGY

## 2025-03-12 PROCEDURE — 88305 TISSUE EXAM BY PATHOLOGIST: CPT | Performed by: UROLOGY

## 2025-03-12 PROCEDURE — 25010000002 PROPOFOL 10 MG/ML EMULSION: Performed by: NURSE ANESTHETIST, CERTIFIED REGISTERED

## 2025-03-12 RX ORDER — MAGNESIUM HYDROXIDE 1200 MG/15ML
LIQUID ORAL AS NEEDED
Status: DISCONTINUED | OUTPATIENT
Start: 2025-03-12 | End: 2025-03-12 | Stop reason: HOSPADM

## 2025-03-12 RX ORDER — ACETAMINOPHEN 650 MG/1
650 SUPPOSITORY RECTAL EVERY 4 HOURS PRN
Status: DISCONTINUED | OUTPATIENT
Start: 2025-03-12 | End: 2025-03-13 | Stop reason: HOSPADM

## 2025-03-12 RX ORDER — ONDANSETRON 2 MG/ML
4 INJECTION INTRAMUSCULAR; INTRAVENOUS ONCE AS NEEDED
Status: DISCONTINUED | OUTPATIENT
Start: 2025-03-12 | End: 2025-03-12 | Stop reason: HOSPADM

## 2025-03-12 RX ORDER — DEXAMETHASONE SODIUM PHOSPHATE 4 MG/ML
INJECTION, SOLUTION INTRA-ARTICULAR; INTRALESIONAL; INTRAMUSCULAR; INTRAVENOUS; SOFT TISSUE AS NEEDED
Status: DISCONTINUED | OUTPATIENT
Start: 2025-03-12 | End: 2025-03-12 | Stop reason: SURG

## 2025-03-12 RX ORDER — MIDAZOLAM HYDROCHLORIDE 2 MG/2ML
0.5 INJECTION, SOLUTION INTRAMUSCULAR; INTRAVENOUS ONCE
Status: COMPLETED | OUTPATIENT
Start: 2025-03-12 | End: 2025-03-12

## 2025-03-12 RX ORDER — SODIUM CHLORIDE 0.9 % (FLUSH) 0.9 %
10 SYRINGE (ML) INJECTION EVERY 12 HOURS SCHEDULED
Status: DISCONTINUED | OUTPATIENT
Start: 2025-03-12 | End: 2025-03-13 | Stop reason: HOSPADM

## 2025-03-12 RX ORDER — EPHEDRINE SULFATE 50 MG/ML
INJECTION INTRAVENOUS AS NEEDED
Status: DISCONTINUED | OUTPATIENT
Start: 2025-03-12 | End: 2025-03-12 | Stop reason: SURG

## 2025-03-12 RX ORDER — OXYCODONE HYDROCHLORIDE 5 MG/1
5 TABLET ORAL
Status: COMPLETED | OUTPATIENT
Start: 2025-03-12 | End: 2025-03-12

## 2025-03-12 RX ORDER — ACETAMINOPHEN 325 MG/1
650 TABLET ORAL EVERY 4 HOURS PRN
Status: DISCONTINUED | OUTPATIENT
Start: 2025-03-12 | End: 2025-03-13 | Stop reason: HOSPADM

## 2025-03-12 RX ORDER — OXYCODONE AND ACETAMINOPHEN 7.5; 325 MG/1; MG/1
2 TABLET ORAL EVERY 4 HOURS PRN
Status: DISCONTINUED | OUTPATIENT
Start: 2025-03-12 | End: 2025-03-12 | Stop reason: HOSPADM

## 2025-03-12 RX ORDER — LISINOPRIL 20 MG/1
20 TABLET ORAL DAILY
Status: DISCONTINUED | OUTPATIENT
Start: 2025-03-13 | End: 2025-03-13 | Stop reason: HOSPADM

## 2025-03-12 RX ORDER — DOCUSATE SODIUM 100 MG/1
100 CAPSULE, LIQUID FILLED ORAL 2 TIMES DAILY PRN
Status: DISCONTINUED | OUTPATIENT
Start: 2025-03-12 | End: 2025-03-13 | Stop reason: HOSPADM

## 2025-03-12 RX ORDER — OXYCODONE HYDROCHLORIDE 5 MG/1
5 TABLET ORAL EVERY 4 HOURS PRN
Status: DISCONTINUED | OUTPATIENT
Start: 2025-03-12 | End: 2025-03-13 | Stop reason: HOSPADM

## 2025-03-12 RX ORDER — PROMETHAZINE HYDROCHLORIDE 25 MG/1
25 SUPPOSITORY RECTAL ONCE AS NEEDED
Status: DISCONTINUED | OUTPATIENT
Start: 2025-03-12 | End: 2025-03-12 | Stop reason: HOSPADM

## 2025-03-12 RX ORDER — SODIUM CHLORIDE 0.9 % (FLUSH) 0.9 %
3 SYRINGE (ML) INJECTION EVERY 12 HOURS SCHEDULED
Status: DISCONTINUED | OUTPATIENT
Start: 2025-03-12 | End: 2025-03-12 | Stop reason: HOSPADM

## 2025-03-12 RX ORDER — PHENYLEPHRINE HCL IN 0.9% NACL 0.5 MG/5ML
SYRINGE (ML) INTRAVENOUS AS NEEDED
Status: DISCONTINUED | OUTPATIENT
Start: 2025-03-12 | End: 2025-03-12 | Stop reason: SURG

## 2025-03-12 RX ORDER — ACETAMINOPHEN 500 MG
1000 TABLET ORAL ONCE
Status: COMPLETED | OUTPATIENT
Start: 2025-03-12 | End: 2025-03-12

## 2025-03-12 RX ORDER — SODIUM CHLORIDE 9 MG/ML
40 INJECTION, SOLUTION INTRAVENOUS AS NEEDED
Status: DISCONTINUED | OUTPATIENT
Start: 2025-03-12 | End: 2025-03-13 | Stop reason: HOSPADM

## 2025-03-12 RX ORDER — PROPOFOL 10 MG/ML
VIAL (ML) INTRAVENOUS AS NEEDED
Status: DISCONTINUED | OUTPATIENT
Start: 2025-03-12 | End: 2025-03-12 | Stop reason: SURG

## 2025-03-12 RX ORDER — FENTANYL CITRATE 50 UG/ML
INJECTION, SOLUTION INTRAMUSCULAR; INTRAVENOUS AS NEEDED
Status: DISCONTINUED | OUTPATIENT
Start: 2025-03-12 | End: 2025-03-12 | Stop reason: SURG

## 2025-03-12 RX ORDER — SODIUM CHLORIDE 0.9 % (FLUSH) 0.9 %
10 SYRINGE (ML) INJECTION AS NEEDED
Status: DISCONTINUED | OUTPATIENT
Start: 2025-03-12 | End: 2025-03-12 | Stop reason: HOSPADM

## 2025-03-12 RX ORDER — SODIUM CHLORIDE 0.9 % (FLUSH) 0.9 %
10 SYRINGE (ML) INJECTION AS NEEDED
Status: DISCONTINUED | OUTPATIENT
Start: 2025-03-12 | End: 2025-03-13 | Stop reason: HOSPADM

## 2025-03-12 RX ORDER — ROCURONIUM BROMIDE 10 MG/ML
INJECTION, SOLUTION INTRAVENOUS AS NEEDED
Status: DISCONTINUED | OUTPATIENT
Start: 2025-03-12 | End: 2025-03-12 | Stop reason: SURG

## 2025-03-12 RX ORDER — SODIUM CHLORIDE, SODIUM LACTATE, POTASSIUM CHLORIDE, CALCIUM CHLORIDE 600; 310; 30; 20 MG/100ML; MG/100ML; MG/100ML; MG/100ML
9 INJECTION, SOLUTION INTRAVENOUS CONTINUOUS PRN
Status: ACTIVE | OUTPATIENT
Start: 2025-03-12 | End: 2025-03-13

## 2025-03-12 RX ORDER — ONDANSETRON 4 MG/1
4 TABLET, ORALLY DISINTEGRATING ORAL EVERY 6 HOURS PRN
Status: DISCONTINUED | OUTPATIENT
Start: 2025-03-12 | End: 2025-03-13 | Stop reason: HOSPADM

## 2025-03-12 RX ORDER — PROMETHAZINE HYDROCHLORIDE 25 MG/1
25 TABLET ORAL ONCE AS NEEDED
Status: DISCONTINUED | OUTPATIENT
Start: 2025-03-12 | End: 2025-03-12 | Stop reason: HOSPADM

## 2025-03-12 RX ORDER — ONDANSETRON 2 MG/ML
INJECTION INTRAMUSCULAR; INTRAVENOUS AS NEEDED
Status: DISCONTINUED | OUTPATIENT
Start: 2025-03-12 | End: 2025-03-12 | Stop reason: SURG

## 2025-03-12 RX ORDER — ONDANSETRON 2 MG/ML
4 INJECTION INTRAMUSCULAR; INTRAVENOUS EVERY 6 HOURS PRN
Status: DISCONTINUED | OUTPATIENT
Start: 2025-03-12 | End: 2025-03-13 | Stop reason: HOSPADM

## 2025-03-12 RX ORDER — PANTOPRAZOLE SODIUM 40 MG/1
40 TABLET, DELAYED RELEASE ORAL
Status: DISCONTINUED | OUTPATIENT
Start: 2025-03-13 | End: 2025-03-13 | Stop reason: HOSPADM

## 2025-03-12 RX ORDER — SODIUM CHLORIDE 9 MG/ML
40 INJECTION, SOLUTION INTRAVENOUS AS NEEDED
Status: DISCONTINUED | OUTPATIENT
Start: 2025-03-12 | End: 2025-03-12 | Stop reason: HOSPADM

## 2025-03-12 RX ORDER — LIDOCAINE HYDROCHLORIDE 20 MG/ML
INJECTION, SOLUTION EPIDURAL; INFILTRATION; INTRACAUDAL; PERINEURAL AS NEEDED
Status: DISCONTINUED | OUTPATIENT
Start: 2025-03-12 | End: 2025-03-12 | Stop reason: SURG

## 2025-03-12 RX ORDER — DEXMEDETOMIDINE HYDROCHLORIDE 100 UG/ML
INJECTION, SOLUTION INTRAVENOUS AS NEEDED
Status: DISCONTINUED | OUTPATIENT
Start: 2025-03-12 | End: 2025-03-12 | Stop reason: SURG

## 2025-03-12 RX ORDER — SODIUM CHLORIDE 9 MG/ML
60 INJECTION, SOLUTION INTRAVENOUS CONTINUOUS
Status: DISCONTINUED | OUTPATIENT
Start: 2025-03-12 | End: 2025-03-13 | Stop reason: HOSPADM

## 2025-03-12 RX ADMIN — SODIUM CHLORIDE, POTASSIUM CHLORIDE, SODIUM LACTATE AND CALCIUM CHLORIDE: 600; 310; 30; 20 INJECTION, SOLUTION INTRAVENOUS at 13:07

## 2025-03-12 RX ADMIN — ONDANSETRON 4 MG: 2 INJECTION INTRAMUSCULAR; INTRAVENOUS at 12:00

## 2025-03-12 RX ADMIN — OXYCODONE HYDROCHLORIDE 5 MG: 5 TABLET ORAL at 13:49

## 2025-03-12 RX ADMIN — ROCURONIUM BROMIDE 20 MG: 10 INJECTION, SOLUTION INTRAVENOUS at 12:36

## 2025-03-12 RX ADMIN — EPHEDRINE SULFATE 10 MG: 50 INJECTION INTRAVENOUS at 12:48

## 2025-03-12 RX ADMIN — LIDOCAINE HYDROCHLORIDE 100 MG: 20 INJECTION, SOLUTION EPIDURAL; INFILTRATION; INTRACAUDAL; PERINEURAL at 11:45

## 2025-03-12 RX ADMIN — FENTANYL CITRATE 50 MCG: 50 INJECTION, SOLUTION INTRAMUSCULAR; INTRAVENOUS at 11:51

## 2025-03-12 RX ADMIN — MIDAZOLAM HYDROCHLORIDE 0.5 MG: 1 INJECTION, SOLUTION INTRAMUSCULAR; INTRAVENOUS at 11:20

## 2025-03-12 RX ADMIN — Medication 100 MCG: at 13:02

## 2025-03-12 RX ADMIN — ACETAMINOPHEN 1000 MG: 500 TABLET ORAL at 10:22

## 2025-03-12 RX ADMIN — HYDROMORPHONE HYDROCHLORIDE 0.5 MG: 1 INJECTION, SOLUTION INTRAMUSCULAR; INTRAVENOUS; SUBCUTANEOUS at 13:50

## 2025-03-12 RX ADMIN — DEXAMETHASONE SODIUM PHOSPHATE 4 MG: 4 INJECTION, SOLUTION INTRAMUSCULAR; INTRAVENOUS at 12:00

## 2025-03-12 RX ADMIN — OXYCODONE HYDROCHLORIDE 5 MG: 5 TABLET ORAL at 14:05

## 2025-03-12 RX ADMIN — DEXMEDETOMIDINE 10 MCG: 100 INJECTION, SOLUTION, CONCENTRATE INTRAVENOUS at 11:43

## 2025-03-12 RX ADMIN — Medication 100 MCG: at 12:46

## 2025-03-12 RX ADMIN — PROPOFOL 150 MG: 10 INJECTION, EMULSION INTRAVENOUS at 11:45

## 2025-03-12 RX ADMIN — SUGAMMADEX 200 MG: 100 INJECTION, SOLUTION INTRAVENOUS at 13:12

## 2025-03-12 RX ADMIN — ONDANSETRON 4 MG: 2 INJECTION INTRAMUSCULAR; INTRAVENOUS at 13:49

## 2025-03-12 RX ADMIN — SODIUM CHLORIDE 60 ML/HR: 9 INJECTION, SOLUTION INTRAVENOUS at 15:30

## 2025-03-12 RX ADMIN — ROCURONIUM BROMIDE 50 MG: 10 INJECTION, SOLUTION INTRAVENOUS at 11:45

## 2025-03-12 RX ADMIN — SODIUM CHLORIDE 2 G: 9 INJECTION, SOLUTION INTRAVENOUS at 11:48

## 2025-03-12 RX ADMIN — Medication 10 ML: at 20:18

## 2025-03-12 RX ADMIN — FENTANYL CITRATE 50 MCG: 50 INJECTION, SOLUTION INTRAMUSCULAR; INTRAVENOUS at 12:36

## 2025-03-12 RX ADMIN — SODIUM CHLORIDE, POTASSIUM CHLORIDE, SODIUM LACTATE AND CALCIUM CHLORIDE 9 ML/HR: 600; 310; 30; 20 INJECTION, SOLUTION INTRAVENOUS at 10:22

## 2025-03-12 NOTE — OP NOTE
CYSTOSCOPY TRANSURETHRAL RESECTION OF PROSTATE  Procedure Report    Patient Name:  Kwaku Cloud  YOB: 1950    Date of Surgery:  3/12/2025      Pre-op Diagnosis:   Benign prostatic hyperplasia with lower urinary tract symptoms, symptom details unspecified [N40.1]       Postop diagnosis:    Same    Procedure/CPT® Codes:  MT TRURL ELECTROSURG RESCJ PROSTATE BLEED COMPLETE [41306]        Procedure(s):  Cystolitholapaxy   12 stones removed ranging in size from 4 mm to 1 cm.  Total stones  about 3 cm    TRANSURETHRAL RESECTION OF PROSTATE      .   Staff:  Surgeon(s):  Jaylen Hines MD    Assistant: Erwin Mi RN CSA    Anesthesia: General    Estimated Blood Loss: <500ml    Implants:    Nothing was implanted during the procedure    Specimen:          Specimens       ID Source Type Tests Collected By Collected At Frozen?    A Prostate Tissue TISSUE PATHOLOGY EXAM   Jaylen Hines MD 3/12/25 0710     Description: Prostate Chips.    This specimen was not marked as sent.                Findings:       12 stones ranging in size from 4 mm to 1 cm.  Most of these were flushed out but I did have to crush a few with a lithotrite and removed.    6 cm long prostate opened up without issue      Complications: none    Description of Procedure:     Brief description of procedure    After informed consent patient was taken to the operating room.  Patient was placed supine and placed under general anesthesia by the anesthesia team.  Patient was prepped and draped in normal sterile fashion after being placed in dorsal lithotomy position.  A multidisciplinary timeout was undertaken documenting the correct patient site and procedure.  At this point a 22 rigid cystoscope was advanced into the urethra.  Anterior urethra and prostatic urethra were normal.  Bladder was examined and found to be normal.  Bilateral ureteral orifices were in normal anatomic location.  At this time I used a visual obturator to  place a resectoscope into the bladder.  A large bipolar loop was then placed into the bladder and the median lobe was started.  This was resected down to the right above the verumontanum.  I then did the left lateral lobe and the right lateral lobe.  Also took a small amount of anterior tissue.       During the procedure I was able to flush out many of the stones after resecting the median lobe.    I did use a lithotrite to remove some of stones at the end.     After this all pieces were removed from the bladder with Ellik evacuator.  Hemostasis was obtained.  22 Persian three-way Travis catheter was placed into the bladder with 30 cc of sterile water placed in the balloon.  This was placed to straight drainage and also attached to continuous bladder irrigation.  Patient tolerated the procedure well and was taken to the post anesthesia area without issue.      Assistant: Erwin Mi RN CSA  was responsible for performing the following activities: Held/Positioned Camera   and their skilled assistance was necessary for the success of this case.    Jaylen Hines MD     Date: 3/12/2025  Time: 13:15 EDT

## 2025-03-12 NOTE — H&P
[facility]   UROLOGY HISTORY AND PHYSICAL    Patient Name: Kwaku Cloud  : 1950  MRN: [id number]  Primary Care Physician:  Rusty Watts MD  Date of admission: 3/12/2025    Subjective   Subjective     Chief Complaint:     Bladder stones  BPH    HPI:    Kwaku Cloud is a 74 y.o. male     Bladder stones  BPH    No change in H&P    Review of Systems     10 systems reviewed and are negative other than what is listed in HPI    Personal History     Past Medical History:   Diagnosis Date    BPH (benign prostatic hyperplasia)     Cancer     COLON CANCER- HAD SURGERY    GERD (gastroesophageal reflux disease)     Hyperlipidemia     Hypertension        Past Surgical History:   Procedure Laterality Date    COLON RESECTION         Family History: family history is not on file. Otherwise pertinent FHx was reviewed and not pertinent to current issue.    Social History:  reports that he has quit smoking. His smoking use included cigarettes. He has been exposed to tobacco smoke. He has never used smokeless tobacco. He reports that he does not drink alcohol and does not use drugs.    Home Medications:  Florajen Acidophilus, finasteride, lisinopril, omeprazole, sildenafil, and tamsulosin      Allergies:  No Known Allergies    Objective   Objective     Vitals:   Temp:  [97 °F (36.1 °C)] 97 °F (36.1 °C)  Heart Rate:  [71] 71  Resp:  [20] 20  BP: (165)/(84) 165/84  Physical Exam    Constitutional: Awake, alert    Respiratory: Clear to auscultation bilaterally, nonlabored respirations    Cardiovascular: RRR, no murmurs, rubs, or gallops, palpable pedal pulses bilaterally   Gastrointestinal: Positive bowel sounds, soft, nontender, nondistended   Musculoskeletal: No bilateral ankle edema, no clubbing or cyanosis to extremities     Result Review    Result Review:  I have personally reviewed the results from the time of this admission to 3/12/2025 09:41 EDT and agree with these findings:  []  Laboratory  []   Microbiology  []  Radiology  []  EKG/Telemetry   []  Cardiology/Vascular   []  Pathology  []  Old records  []  Other:    Assessment & Plan   Assessment / Plan     Brief Patient Summary:  Kwaku Cloud is a 74 y.o. male     Active Hospital Problems:  Active Hospital Problems    Diagnosis     **Benign prostatic hyperplasia with lower urinary tract symptoms     BPH with lower urinary tract symptoms without urinary obstruction        Cystolitholapaxy/TURP.  Risks and benefits were discussed including bleeding, infection and damage to the urinary system.  We also discussed the risk of anesthesia up to and including death.  Patient voiced understanding and would like to proceed.    Electronically signed by Jaylen Hines MD, 03/12/25, 9:41 AM EDT.

## 2025-03-12 NOTE — ANESTHESIA PREPROCEDURE EVALUATION
Anesthesia Evaluation     Patient summary reviewed and Nursing notes reviewed                Airway   Mallampati: I  TM distance: >3 FB  Neck ROM: full  No difficulty expected  Dental    (+) poor dentition    Pulmonary - negative pulmonary ROS and normal exam    breath sounds clear to auscultation  Cardiovascular - normal exam    Rhythm: regular  Rate: normal    (+) hypertension, hyperlipidemia      Neuro/Psych- negative ROS  GI/Hepatic/Renal/Endo    (+) GERD    Musculoskeletal (-) negative ROS    Abdominal    Substance History - negative use     OB/GYN negative ob/gyn ROS         Other      history of cancer                      Anesthesia Plan    ASA 2     general     intravenous induction     Anesthetic plan, risks, benefits, and alternatives have been provided, discussed and informed consent has been obtained with: patient.        CODE STATUS:

## 2025-03-12 NOTE — ANESTHESIA POSTPROCEDURE EVALUATION
Patient: Kwaku Cloud    Procedure Summary       Date: 03/12/25 Room / Location: [facility] OR 09 / [facility] MAIN OR    Anesthesia Start: 1140 Anesthesia Stop: 1325    Procedure: Cystolitholapaxy TRANSURETHRAL RESECTION OF PROSTATE.  Remove bladder stones  with laser ultrasound, remove inside of prostate with scope (Bladder) Diagnosis:       Benign prostatic hyperplasia with lower urinary tract symptoms, symptom details unspecified      (Benign prostatic hyperplasia with lower urinary tract symptoms, symptom details unspecified [N40.1])    Surgeons: Jaylen Hines MD Provider: Henrique Gardner MD    Anesthesia Type: general ASA Status: 2            Anesthesia Type: general    Vitals  Vitals Value Taken Time   /53 03/12/25 14:14   Temp 36.2 °C (97.2 °F) 03/12/25 14:00   Pulse 62 03/12/25 14:23   Resp 20 03/12/25 14:00   SpO2 94 % 03/12/25 14:23   Vitals shown include unfiled device data.        Post Anesthesia Care and Evaluation    Patient location during evaluation: bedside  Patient participation: complete - patient participated  Level of consciousness: awake    Airway patency: patent  PONV Status: none  Cardiovascular status: acceptable  Respiratory status: acceptable  Hydration status: acceptable

## 2025-03-12 NOTE — ANESTHESIA PROCEDURE NOTES
Airway  Reason: elective    Date/Time: 3/12/2025 11:47 AM  Airway not difficult    General Information and Staff    Patient location during procedure: OR  CRNA/CAA: Lian Delarosa CRNA    Indications and Patient Condition  Indications for airway management: airway protection    Preoxygenated: yes  MILS maintained throughout    Mask difficulty assessment: 3 - difficult mask (inadequate, unstable or two providers) +/- NMBA    Final Airway Details    Final airway type: endotracheal airway      Successful airway: ETT  Cuffed: yes   Successful intubation technique: direct laryngoscopy  Adjuncts used in placement: intubating stylet  Endotracheal tube insertion site: oral  Blade: Sincere  Blade size: 4  ETT size (mm): 7.5  Cormack-Lehane Classification: grade IIa - partial view of glottis  Placement verified by: chest auscultation and capnometry   Measured from: lips  ETT/EBT  to lips (cm): 23  Number of attempts at approach: 1  Assessment: lips, teeth, and gum same as pre-op and atraumatic intubation

## 2025-03-12 NOTE — PLAN OF CARE
Goal Outcome Evaluation:  Plan of Care Reviewed With: patient        Progress: improving  Outcome Evaluation: VSS. Client arrived to floor this afternoon following TURP. Travis catherter in place with pink-tinged clear output. no clots noted. Client has had 1 small bowel movment. Pain controlled per pt. Continue current plan of care.

## 2025-03-13 VITALS
WEIGHT: 213.41 LBS | HEIGHT: 72 IN | RESPIRATION RATE: 18 BRPM | HEART RATE: 60 BPM | TEMPERATURE: 97.7 F | DIASTOLIC BLOOD PRESSURE: 88 MMHG | OXYGEN SATURATION: 94 % | BODY MASS INDEX: 28.91 KG/M2 | SYSTOLIC BLOOD PRESSURE: 173 MMHG

## 2025-03-13 PROCEDURE — 94799 UNLISTED PULMONARY SVC/PX: CPT

## 2025-03-13 PROCEDURE — 25810000003 SODIUM CHLORIDE 0.9 % SOLUTION: Performed by: UROLOGY

## 2025-03-13 RX ORDER — DOCUSATE SODIUM 100 MG/1
100 CAPSULE, LIQUID FILLED ORAL 2 TIMES DAILY PRN
Qty: 60 CAPSULE | Refills: 1 | Status: SHIPPED | OUTPATIENT
Start: 2025-03-13

## 2025-03-13 RX ORDER — HYDROCODONE BITARTRATE AND ACETAMINOPHEN 5; 325 MG/1; MG/1
1 TABLET ORAL EVERY 6 HOURS PRN
Qty: 12 TABLET | Refills: 0 | Status: SHIPPED | OUTPATIENT
Start: 2025-03-13

## 2025-03-13 RX ADMIN — Medication 10 ML: at 08:07

## 2025-03-13 RX ADMIN — SODIUM CHLORIDE 60 ML/HR: 9 INJECTION, SOLUTION INTRAVENOUS at 02:29

## 2025-03-13 RX ADMIN — PANTOPRAZOLE SODIUM 40 MG: 40 TABLET, DELAYED RELEASE ORAL at 06:23

## 2025-03-13 RX ADMIN — LISINOPRIL 20 MG: 20 TABLET ORAL at 08:02

## 2025-03-13 NOTE — PLAN OF CARE
Goal Outcome Evaluation:  Plan of Care Reviewed With: patient        Progress: improving  Outcome Evaluation: CBI off, urine remains clear pink 650 out, no clots noticed, up in kang 600 ft, no change in urine, tolerating regular diet, passing gas/BM, no c/o pain, BP elevated, home lisinopril given, catheter teaching complete, prepare to d/c home with family for self care.

## 2025-03-13 NOTE — DISCHARGE SUMMARY
[facility]         DISCHARGE SUMMARY    Patient Name: Kwaku Cloud  : 1950  MRN: [id number]    Date of Admission: 3/12/2025  Date of Discharge:  25   Primary Care Physician: Rusty Watts MD    Consults       No orders found for last 30 day(s).            Surgical Procedures Since Admission:  Procedure(s):  Cystolitholapaxy TRANSURETHRAL RESECTION OF PROSTATE.  Remove bladder stones  with laser ultrasound, remove inside of prostate with scope  Surgeon:  Jaylen Hines MD  Status:  1 Day Post-Op  -------------------      Presenting Problem:   Benign prostatic hyperplasia with lower urinary tract symptoms, symptom details unspecified [N40.1]  BPH with lower urinary tract symptoms without urinary obstruction [N40.1]    Active and Resolved Hospital Problems:  Active Hospital Problems    Diagnosis POA    **Benign prostatic hyperplasia with lower urinary tract symptoms [N40.1] Unknown    BPH with lower urinary tract symptoms without urinary obstruction [N40.1] Yes      Resolved Hospital Problems   No resolved problems to display.         Hospital Course     Hospital Course:  Kwaku Cloud is a 74 y.o. male   Came in had TURP with cystolitholapaxy, did well went home with catheter next day    Day of Discharge     Vital Signs:  Temp:  [97 °F (36.1 °C)-97.7 °F (36.5 °C)] 97.3 °F (36.3 °C)  Heart Rate:  [66-80] 69  Resp:  [14-20] 18  BP: (115-179)/(53-93) 179/93  Flow (L/min) (Oxygen Therapy):  [2-6] 2  Output by Drain (mL) 25 0701 - 25 1900 25 1901 - 25 0628 Range Total   Continuous Bladder Irrigation Triple-lumen 22 Fr [phone]         Pertinent  and/or Most Recent Results     LAB RESULTS:                  Brief Urine Lab Results  (Last result in the past 365 days)        Color   Clarity   Blood   Leuk Est   Nitrite   Protein   CREAT   Urine HCG        25 1213 Yellow   Clear   Negative   Negative   Negative   Negative                 Microbiology  Results (last 10 days)       Procedure Component Value - Date/Time    Urine Culture - Urine, Urine, Clean Catch [[id number]]  (Normal) Collected: 03/06/25 1213    Lab Status: Final result Specimen: Urine, Clean Catch Updated: 03/07/25 1214     Urine Culture No growth             Labs Pending at Discharge:  Pending Labs       Order Current Status    Tissue Pathology Exam Collected (03/12/25 1303)            Discharge Details        Discharge Medications        New Medications        Instructions Start Date   docusate sodium 100 MG capsule  Commonly known as: Colace   100 mg, Oral, 2 Times Daily PRN      HYDROcodone-acetaminophen 5-325 MG per tablet  Commonly known as: NORCO   1 tablet, Oral, Every 6 Hours PRN             Continue These Medications        Instructions Start Date   lisinopril 20 MG tablet  Commonly known as: PRINIVIL,ZESTRIL   20 mg, Oral, Daily      omeprazole 20 MG capsule  Commonly known as: priLOSEC   1 capsule, Oral, Daily             Stop These Medications      finasteride 5 MG tablet  Commonly known as: PROSCAR     tamsulosin 0.4 MG capsule 24 hr capsule  Commonly known as: FLOMAX              No Known Allergies    Condition:    Good, stable    Physical exam:    Constitutional: Well-appearing, well-developed,  in no acute distress    Respiratory: Unlabored breathing    Abdominal: Nontender, nondistended, no rigidity or guarding, no hepatosplenomegaly    Genitourinary:     Bladder nonpalpable     Neurologic: Grossly oriented to person, place and time, judgment and insight intact, normal mood and affect          Discharge Disposition:  Home or Self Care    Diet:  Hospital:  Diet Order   Procedures    Diet: Regular/House; Fluid Consistency: Thin (IDDSI 0)       Discharge Activity:       No future appointments.  Follow-up in 3 days for cath removal in office

## 2025-03-14 LAB
CYTO UR: NORMAL
LAB AP CASE REPORT: NORMAL
LAB AP CLINICAL INFORMATION: NORMAL
PATH REPORT.FINAL DX SPEC: NORMAL
PATH REPORT.GROSS SPEC: NORMAL

## 2025-03-17 ENCOUNTER — OFFICE VISIT (OUTPATIENT)
Dept: UROLOGY | Age: 75
End: 2025-03-17
Payer: COMMERCIAL

## 2025-03-17 VITALS — WEIGHT: 213 LBS | BODY MASS INDEX: 28.89 KG/M2

## 2025-03-17 DIAGNOSIS — N21.0 BLADDER STONE: ICD-10-CM

## 2025-03-17 DIAGNOSIS — N40.1 BENIGN PROSTATIC HYPERPLASIA WITH LOWER URINARY TRACT SYMPTOMS, SYMPTOM DETAILS UNSPECIFIED: Primary | ICD-10-CM

## 2025-03-17 PROCEDURE — 99024 POSTOP FOLLOW-UP VISIT: CPT | Performed by: UROLOGY

## 2025-03-17 NOTE — PROGRESS NOTES
Chief Complaint: Urologic complaint    Subjective         History of Present Illness  Kwaku Cloud is a 74 y.o. male     BPH with bladder stones  Elevated PSA          3/12/2025 cystolitholapaxy/TURP - 12 stones removed from the bladder  Path - BPH        Has been pretty clear is a little gross blood, no fevers some urgency    Off  BPH meds         Tadalafil 20 - did not help.           No CAD, no anticoagulation, non-smoker             Previous       1/24 CT abdomen/pelvis without - 2 cm cluster of stones posterior urinary bladder on the left.     10/23/2024 UA-3-5 RBCs, no bacteria, nitrite negative, urine culture negative           4/1/24 urine culture - >  100,000 E. Coli -   canceled surgery, placed on Levaquin 500 mg x 10 days and Florajen.  Repeat culture, preferably one of our labs.  2 weeks before repeat surgery, place surgery on aThursday 4/24 Q-[place] of 11, voided volume 123, PVR 34     1/24 CT abdomen/pelvis without - 2 cm cluster of stones posterior urinary bladder on the left.        6/22 cystoscopy-Dr. Jamison - prostate lateral lobes with moderate obstruction median lobe with severe obstruction.  Small bladder stone.  Bladder mucosa moderately trabeculated.     6/22  Urologist records reviewed - started finasteride, discussed TURP        7/25/2023 CT abdomen/pelvis without - noncalcified lung nodules.  Consider low-dose CT chest.  Stones in the urinary bladder, 10 stones largest being 1 cm.  99g prostate .  Images reviewed        Has had retention x2 with catheter placement prior to starting finasteride     Frequency, urgency, incontinence nocturia x10 if he drinks caffeine in the evening, weak stream     No  surgeries, no  family history     Colon cancer 2004     He does have SOA at times        PVR     12/23     014           6/23   022, UA-negative     4/23   1.1, GFR  > 60          PSA     7/23   MRI prostate - 106 g , thickening of the bladder wall, no signs of localizing lesion.   Bladder stone  PSAd  0.08  4/23    9.2             Objective     Past Medical History:   Diagnosis Date    BPH (benign prostatic hyperplasia)     Cancer     COLON CANCER- HAD SURGERY    GERD (gastroesophageal reflux disease)     Hyperlipidemia     Hypertension                      Assessment and Plan    Diagnoses and all orders for this visit:    1. Benign prostatic hyperplasia with lower urinary tract symptoms, symptom details unspecified (Primary)    2. Bladder stone         Void Trial today, patient understands he cannot void needs go to the clinic or go to emergency room.        Fu 1 to 2 weeks      Discussed with the patient if they have a fever greater than 100.5, intractable nausea/vomiting or intractable pain they should go to the emergency room.      PVR follow-up

## 2025-03-20 ENCOUNTER — TELEPHONE (OUTPATIENT)
Dept: UROLOGY | Age: 75
End: 2025-03-20
Payer: COMMERCIAL

## 2025-03-20 NOTE — TELEPHONE ENCOUNTER
PATIENT HAD SURGERY ON 3/12. HE CALLED TO REPORT THAT HE IS HAVING URINARY FREQUENCY BUT ONLY GOING ABOUT 1/2 CUP EACH TIME. DOES NOT HAVE BLADDER PAIN AND DOES NOT FEEL DISTENDED. STATES THAT HE HAS BURNING WITH URINATION IF HE STANDS TO URINATE BUT NO BURNING IF HE SITS. HE DID NOT KNOW IF THESE THINGS WERE TO BE EXPECTED.

## 2025-03-21 ENCOUNTER — OFFICE VISIT (OUTPATIENT)
Dept: UROLOGY | Age: 75
End: 2025-03-21
Payer: COMMERCIAL

## 2025-03-21 ENCOUNTER — LAB (OUTPATIENT)
Facility: HOSPITAL | Age: 75
End: 2025-03-21
Payer: COMMERCIAL

## 2025-03-21 DIAGNOSIS — R30.0 DYSURIA: ICD-10-CM

## 2025-03-21 DIAGNOSIS — N40.1 BENIGN PROSTATIC HYPERPLASIA WITH LOWER URINARY TRACT SYMPTOMS, SYMPTOM DETAILS UNSPECIFIED: Primary | ICD-10-CM

## 2025-03-21 LAB
ANION GAP SERPL CALCULATED.3IONS-SCNC: 10.7 MMOL/L (ref 5–15)
BUN SERPL-MCNC: 12 MG/DL (ref 8–23)
BUN/CREAT SERPL: 12.2 (ref 7–25)
CALCIUM SPEC-SCNC: 10.4 MG/DL (ref 8.6–10.5)
CHLORIDE SERPL-SCNC: 101 MMOL/L (ref 98–107)
CO2 SERPL-SCNC: 23.3 MMOL/L (ref 22–29)
CREAT SERPL-MCNC: 0.98 MG/DL (ref 0.76–1.27)
EGFRCR SERPLBLD CKD-EPI 2021: 80.9 ML/MIN/1.73
GLUCOSE SERPL-MCNC: 85 MG/DL (ref 65–99)
POTASSIUM SERPL-SCNC: 4.5 MMOL/L (ref 3.5–5.2)
SODIUM SERPL-SCNC: 135 MMOL/L (ref 136–145)

## 2025-03-21 PROCEDURE — 87077 CULTURE AEROBIC IDENTIFY: CPT | Performed by: UROLOGY

## 2025-03-21 PROCEDURE — 36415 COLL VENOUS BLD VENIPUNCTURE: CPT

## 2025-03-21 PROCEDURE — 87186 SC STD MICRODIL/AGAR DIL: CPT | Performed by: UROLOGY

## 2025-03-21 PROCEDURE — 80048 BASIC METABOLIC PNL TOTAL CA: CPT

## 2025-03-21 PROCEDURE — 87086 URINE CULTURE/COLONY COUNT: CPT | Performed by: UROLOGY

## 2025-03-21 RX ORDER — CEFDINIR 300 MG/1
300 CAPSULE ORAL 2 TIMES DAILY
Qty: 20 CAPSULE | Refills: 0 | Status: SHIPPED | OUTPATIENT
Start: 2025-03-21 | End: 2025-03-31

## 2025-03-21 NOTE — PROGRESS NOTES
Chief Complaint: Urologic complaint    Subjective         History of Present Illness  Kwaku Cloud is a 74 y.o. male     BPH with bladder stones  Elevated PSA          3/12/2025 cystolitholapaxy/TURP - 12 stones removed from the bladder  Path - BPH          Patient is having some urgency and urge incontinence.    Nitrite positive today    Burning/dysuria nocturia x 2, frequency Q 1.5 hrs      PVR    3/25   007        Off  BPH meds         Tadalafil 20 - did not help.           No CAD, no anticoagulation, non-smoker             Previous       1/24 CT abdomen/pelvis without - 2 cm cluster of stones posterior urinary bladder on the left.     10/23/2024 UA-3-5 RBCs, no bacteria, nitrite negative, urine culture negative           4/1/24 urine culture - >  100,000 E. Coli -   canceled surgery, placed on Levaquin 500 mg x 10 days and Florajen.  Repeat culture, preferably one of our labs.  2 weeks before repeat surgery, place surgery on aThursday 4/24 Q-[place] of 11, voided volume 123, PVR 34     1/24 CT abdomen/pelvis without - 2 cm cluster of stones posterior urinary bladder on the left.        6/22 cystoscopy-Dr. Jamison - prostate lateral lobes with moderate obstruction median lobe with severe obstruction.  Small bladder stone.  Bladder mucosa moderately trabeculated.     6/22  Urologist records reviewed - started finasteride, discussed TURP        7/25/2023 CT abdomen/pelvis without - noncalcified lung nodules.  Consider low-dose CT chest.  Stones in the urinary bladder, 10 stones largest being 1 cm.  99g prostate .  Images reviewed        Has had retention x2 with catheter placement prior to starting finasteride     Frequency, urgency, incontinence nocturia x10 if he drinks caffeine in the evening, weak stream     No  surgeries, no  family history     Colon cancer 2004     He does have SOA at times        PVR     12/23     014           6/23   022, UA-negative     4/23   1.1, GFR  > 60          PSA     7/23    MRI prostate - 106 g , thickening of the bladder wall, no signs of localizing lesion.  Bladder stone  PSAd  0.08  4/23    9.2             Objective     Past Medical History:   Diagnosis Date    BPH (benign prostatic hyperplasia)     Cancer     COLON CANCER- HAD SURGERY    GERD (gastroesophageal reflux disease)     Hyperlipidemia     Hypertension                      Assessment and Plan    Diagnoses and all orders for this visit:    1. Benign prostatic hyperplasia with lower urinary tract symptoms, symptom details unspecified (Primary)      BPH      Dysuria nitrite positive urine likely UTI    BMP    Start Omnicef 300 mg p.o. twice daily x 10 days.  Risk and benefits discussed [place]        Follow-up in 3 months

## 2025-03-21 NOTE — TELEPHONE ENCOUNTER
PATIENT CALLED TO F/U ON MESSAGE.  SYMPTOMS GIVEN YESTERDAY AND THE SAME.    HE SAID HE IS NOT URINATING VERY MUCH AT ALL, IN COMPARISON TO HIS INPUT.     HE DOESN'T KNOW IF THIS IS NORMAL.      #[phone]

## 2025-03-23 LAB — BACTERIA SPEC AEROBE CULT: ABNORMAL

## 2025-03-28 LAB
QT INTERVAL: 384 MS
QTC INTERVAL: 406 MS

## 2025-06-20 NOTE — PROGRESS NOTES
Chief Complaint: Urologic complaint    Subjective         History of Present Illness  Kwaku Cloud is a 74 y.o. male     BPH with bladder stones  Elevated PSA          3/12/2025 cystolitholapaxy/TURP - 12 stones removed from the bladder  Path - BPH          Good stream    No GH//burning    Urgency with urge incontinence.  No pads just as get the bathroom quickly  Noc X  1      PVR    3/25   007        Off  BPH meds         Tadalafil 20 - did not help.           No CAD, no anticoagulation, non-smoker             Previous       1/24 CT abdomen/pelvis without - 2 cm cluster of stones posterior urinary bladder on the left.     10/23/2024 UA-3-5 RBCs, no bacteria, nitrite negative, urine culture negative           4/1/24 urine culture - >  100,000 E. Coli -   canceled surgery, placed on Levaquin 500 mg x 10 days and Florajen.  Repeat culture, preferably one of our labs.  2 weeks before repeat surgery, place surgery on aThursday 4/24 Q-[place] of 11, voided volume 123, PVR 34     1/24 CT abdomen/pelvis without - 2 cm cluster of stones posterior urinary bladder on the left.        6/22 cystoscopy-Dr. Jamison - prostate lateral lobes with moderate obstruction median lobe with severe obstruction.  Small bladder stone.  Bladder mucosa moderately trabeculated.     6/22  Urologist records reviewed - started finasteride, discussed TURP        7/25/2023 CT abdomen/pelvis without - noncalcified lung nodules.  Consider low-dose CT chest.  Stones in the urinary bladder, 10 stones largest being 1 cm.  99g prostate .  Images reviewed        Has had retention x2 with catheter placement prior to starting finasteride     Frequency, urgency, incontinence nocturia x10 if he drinks caffeine in the evening, weak stream     No  surgeries, no  family history     Colon cancer 2004     He does have SOA at times        PVR     12/23     014           6/23   022, UA-negative     4/23   1.1, GFR  > 60          PSA     7/23   MRI prostate -  106 g , thickening of the bladder wall, no signs of localizing lesion.  Bladder stone  PSAd  0.08  4/23    9.2             Objective     Past Medical History:   Diagnosis Date    BPH (benign prostatic hyperplasia)     Cancer     COLON CANCER- HAD SURGERY    GERD (gastroesophageal reflux disease)     Hyperlipidemia     Hypertension                      Assessment and Plan    Diagnoses and all orders for this visit:    1. Benign prostatic hyperplasia with lower urinary tract symptoms, symptom details unspecified (Primary)    2. Bladder stone          BPH/bladder stones      Doing better except for some urgency.  We discussed hopefully this will continue to get better over next several months he is not interested in medication at this time      Follow-up as needed

## 2025-06-24 ENCOUNTER — OFFICE VISIT (OUTPATIENT)
Dept: UROLOGY | Age: 75
End: 2025-06-24
Payer: COMMERCIAL

## 2025-06-24 VITALS — BODY MASS INDEX: 27.6 KG/M2 | HEIGHT: 72 IN | WEIGHT: 203.8 LBS

## 2025-06-24 DIAGNOSIS — N21.0 BLADDER STONE: ICD-10-CM

## 2025-06-24 DIAGNOSIS — N40.1 BENIGN PROSTATIC HYPERPLASIA WITH LOWER URINARY TRACT SYMPTOMS, SYMPTOM DETAILS UNSPECIFIED: Primary | ICD-10-CM

## 2025-06-24 PROCEDURE — 99212 OFFICE O/P EST SF 10 MIN: CPT | Performed by: UROLOGY
